# Patient Record
Sex: FEMALE | URBAN - METROPOLITAN AREA
[De-identification: names, ages, dates, MRNs, and addresses within clinical notes are randomized per-mention and may not be internally consistent; named-entity substitution may affect disease eponyms.]

---

## 2020-10-22 ENCOUNTER — APPOINTMENT (RX ONLY)
Dept: URBAN - METROPOLITAN AREA CLINIC 159 | Facility: CLINIC | Age: 85
Setting detail: DERMATOLOGY
End: 2020-10-22

## 2020-10-22 DIAGNOSIS — L259 CONTACT DERMATITIS AND OTHER ECZEMA, UNSPECIFIED CAUSE: ICD-10-CM

## 2020-10-22 PROBLEM — L23.9 ALLERGIC CONTACT DERMATITIS, UNSPECIFIED CAUSE: Status: ACTIVE | Noted: 2020-10-22

## 2020-10-22 PROCEDURE — ? COUNSELING

## 2020-10-22 PROCEDURE — ? PRESCRIPTION

## 2020-10-22 PROCEDURE — ? TREATMENT REGIMEN

## 2020-10-22 PROCEDURE — ? DIAGNOSIS COMMENT

## 2020-10-22 PROCEDURE — 99202 OFFICE O/P NEW SF 15 MIN: CPT

## 2020-10-22 RX ORDER — HYDROCORTISONE 25 MG/G
OINTMENT TOPICAL TWICE A DAY
Qty: 1 | Refills: 1 | Status: ERX | COMMUNITY
Start: 2020-10-22

## 2020-10-22 RX ADMIN — HYDROCORTISONE: 25 OINTMENT TOPICAL at 00:00

## 2020-10-22 ASSESSMENT — LOCATION ZONE DERM: LOCATION ZONE: FACE

## 2020-10-22 ASSESSMENT — LOCATION DETAILED DESCRIPTION DERM: LOCATION DETAILED: LEFT INFERIOR CENTRAL MALAR CHEEK

## 2020-10-22 ASSESSMENT — LOCATION SIMPLE DESCRIPTION DERM: LOCATION SIMPLE: LEFT CHEEK

## 2020-10-22 NOTE — HPI: RASH
What Type Of Note Output Would You Prefer (Optional)?: Standard Output
Is The Patient Presenting As Previously Scheduled?: Yes
How Severe Is Your Rash?: moderate
Is This A New Presentation, Or A Follow-Up?: Rash
Additional History: Patient reports seeing Dr. Alfredo and treated it with LN2. Patient states she applies a cold rag followed by neosporin.

## 2020-10-22 NOTE — PROCEDURE: DIAGNOSIS COMMENT
Comment: Patient was treating with neosporin and rash began to spread. Would like patient to completely stop neosporin and begin hydrocortisone 2.5% ointment BID.
Detail Level: Simple

## 2020-10-22 NOTE — PROCEDURE: MIPS QUALITY
Quality 111:Pneumonia Vaccination Status For Older Adults: Pneumococcal Vaccination Previously Received
Quality 130: Documentation Of Current Medications In The Medical Record: Current Medications Documented
Detail Level: Detailed
Quality 110: Preventive Care And Screening: Influenza Immunization: Influenza Immunization previously received during influenza season
Quality 47: Advance Care Plan: Advance Care Planning discussed and documented; advance care plan or surrogate decision maker documented in the medical record.
Quality 226: Preventive Care And Screening: Tobacco Use: Screening And Cessation Intervention: Patient screened for tobacco use and is an ex/non-smoker

## 2020-10-22 NOTE — PROCEDURE: TREATMENT REGIMEN
Detail Level: Zone
Discontinue Regimen: Neosporin
Plan: Wash face without soap. Gentle moisturizer to face.
Initiate Treatment: Hydrocortisone ointment twice daily until clear (2-3 weeks)

## 2020-11-11 ENCOUNTER — APPOINTMENT (RX ONLY)
Dept: URBAN - METROPOLITAN AREA CLINIC 159 | Facility: CLINIC | Age: 85
Setting detail: DERMATOLOGY
End: 2020-11-11

## 2020-11-11 DIAGNOSIS — L259 CONTACT DERMATITIS AND OTHER ECZEMA, UNSPECIFIED CAUSE: ICD-10-CM | Status: IMPROVED

## 2020-11-11 PROBLEM — L23.9 ALLERGIC CONTACT DERMATITIS, UNSPECIFIED CAUSE: Status: ACTIVE | Noted: 2020-11-11

## 2020-11-11 PROCEDURE — ? PRESCRIPTION

## 2020-11-11 PROCEDURE — ? COUNSELING

## 2020-11-11 PROCEDURE — 99213 OFFICE O/P EST LOW 20 MIN: CPT

## 2020-11-11 PROCEDURE — ? TREATMENT REGIMEN

## 2020-11-11 RX ORDER — TRIAMCINOLONE ACETONIDE 1 MG/G
CREAM TOPICAL TWICE A DAY
Qty: 1 | Refills: 1 | Status: ERX | COMMUNITY
Start: 2020-11-11

## 2020-11-11 RX ADMIN — TRIAMCINOLONE ACETONIDE: 1 CREAM TOPICAL at 00:00

## 2020-11-11 ASSESSMENT — LOCATION ZONE DERM
LOCATION ZONE: TRUNK
LOCATION ZONE: FACE

## 2020-11-11 ASSESSMENT — LOCATION DETAILED DESCRIPTION DERM
LOCATION DETAILED: LEFT INFERIOR CENTRAL MALAR CHEEK
LOCATION DETAILED: STERNAL NOTCH

## 2020-11-11 ASSESSMENT — LOCATION SIMPLE DESCRIPTION DERM
LOCATION SIMPLE: LEFT CHEEK
LOCATION SIMPLE: CHEST

## 2020-11-11 ASSESSMENT — SEVERITY ASSESSMENT: SEVERITY: MODERATE

## 2020-11-11 NOTE — PROCEDURE: TREATMENT REGIMEN
Detail Level: Zone
Discontinue Regimen: Aspercreme\\nStay off of neosporin
Modify Regimen: Triamcinolone once daily x1 week only, if not resolved apply Hydrocortisone ointment twice daily until clear for up to two weeks
Plan: Wash face without soap. Gentle moisturizer to face.\\n\\nPatient did clear when she d/c neosporin.  Then started aspercream and rash recurred.  She will d/c aspercream and stay off of neosporin.  She requests a stronger cream so may use triamcinolone cream sparingly once a day for one week only.

## 2020-12-08 ENCOUNTER — APPOINTMENT (RX ONLY)
Dept: URBAN - METROPOLITAN AREA CLINIC 159 | Facility: CLINIC | Age: 85
Setting detail: DERMATOLOGY
End: 2020-12-08

## 2020-12-08 DIAGNOSIS — L259 CONTACT DERMATITIS AND OTHER ECZEMA, UNSPECIFIED CAUSE: ICD-10-CM | Status: RESOLVED

## 2020-12-08 DIAGNOSIS — L82.0 INFLAMED SEBORRHEIC KERATOSIS: ICD-10-CM

## 2020-12-08 PROBLEM — L23.9 ALLERGIC CONTACT DERMATITIS, UNSPECIFIED CAUSE: Status: ACTIVE | Noted: 2020-12-08

## 2020-12-08 PROCEDURE — 99213 OFFICE O/P EST LOW 20 MIN: CPT | Mod: 25

## 2020-12-08 PROCEDURE — ? COUNSELING

## 2020-12-08 PROCEDURE — 17110 DESTRUCTION B9 LES UP TO 14: CPT

## 2020-12-08 PROCEDURE — ? DIAGNOSIS COMMENT

## 2020-12-08 PROCEDURE — ? LIQUID NITROGEN

## 2020-12-08 ASSESSMENT — LOCATION DETAILED DESCRIPTION DERM
LOCATION DETAILED: RIGHT CENTRAL POSTAURICULAR SKIN
LOCATION DETAILED: LEFT INFERIOR CENTRAL MALAR CHEEK
LOCATION DETAILED: RIGHT MEDIAL ZYGOMA
LOCATION DETAILED: STERNAL NOTCH

## 2020-12-08 ASSESSMENT — LOCATION SIMPLE DESCRIPTION DERM
LOCATION SIMPLE: SCALP
LOCATION SIMPLE: RIGHT ZYGOMA
LOCATION SIMPLE: CHEST
LOCATION SIMPLE: LEFT CHEEK

## 2020-12-08 ASSESSMENT — LOCATION ZONE DERM
LOCATION ZONE: TRUNK
LOCATION ZONE: SCALP
LOCATION ZONE: FACE

## 2020-12-08 ASSESSMENT — SEVERITY ASSESSMENT: SEVERITY: CLEAR

## 2020-12-08 NOTE — PROCEDURE: DIAGNOSIS COMMENT
Detail Level: Simple
Comment: Patient is clear upon exam today. Patient informed to discontinue topical steroids at this time.  She will continue to avoid aspercream and neosporin.

## 2020-12-08 NOTE — PROCEDURE: LIQUID NITROGEN
Render Note In Bullet Format When Appropriate: No
Medical Necessity Information: It is in your best interest to select a reason for this procedure from the list below. All of these items fulfill various CMS LCD requirements except the new and changing color options.
Number Of Freeze-Thaw Cycles: 2 freeze-thaw cycles
Duration Of Freeze Thaw-Cycle (Seconds): 15
Post-Care Instructions: I reviewed with the patient in detail post-care instructions. Patient is to wear sunprotection, and avoid picking at any of the treated lesions. Pt may apply Vaseline to crusted or scabbing areas.
Medical Necessity Clause: This procedure was medically necessary because the lesions that were treated were:
Detail Level: Detailed
Consent: The patient's verbal consent was obtained including but not limited to risks of crusting, scabbing, blistering, scarring, darker or lighter pigmentary change, recurrence, incomplete removal and infection.